# Patient Record
Sex: MALE | Race: WHITE | NOT HISPANIC OR LATINO
[De-identification: names, ages, dates, MRNs, and addresses within clinical notes are randomized per-mention and may not be internally consistent; named-entity substitution may affect disease eponyms.]

---

## 2024-07-09 ENCOUNTER — NON-APPOINTMENT (OUTPATIENT)
Age: 64
End: 2024-07-09

## 2024-07-24 ENCOUNTER — APPOINTMENT (OUTPATIENT)
Dept: OTOLARYNGOLOGY | Facility: CLINIC | Age: 64
End: 2024-07-24
Payer: COMMERCIAL

## 2024-07-24 DIAGNOSIS — Z78.9 OTHER SPECIFIED HEALTH STATUS: ICD-10-CM

## 2024-07-24 DIAGNOSIS — H90.3 SENSORINEURAL HEARING LOSS, BILATERAL: ICD-10-CM

## 2024-07-24 DIAGNOSIS — Z80.9 FAMILY HISTORY OF MALIGNANT NEOPLASM, UNSPECIFIED: ICD-10-CM

## 2024-07-24 DIAGNOSIS — Z82.3 FAMILY HISTORY OF STROKE: ICD-10-CM

## 2024-07-24 PROBLEM — Z00.00 ENCOUNTER FOR PREVENTIVE HEALTH EXAMINATION: Status: ACTIVE | Noted: 2024-07-24

## 2024-07-24 PROCEDURE — 92550 TYMPANOMETRY & REFLEX THRESH: CPT | Mod: 52

## 2024-07-24 PROCEDURE — 99203 OFFICE O/P NEW LOW 30 MIN: CPT

## 2024-07-24 PROCEDURE — 92557 COMPREHENSIVE HEARING TEST: CPT

## 2024-07-24 NOTE — DATA REVIEWED
[de-identified] : In light of the patients current symptoms, Complete audiometry was ordered and completed today. I have interpreted these results and reviewed them in detail with the patient.  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

## 2024-07-24 NOTE — ASSESSMENT
[FreeTextEntry1] : The etiologies of asymmetric high-frequency hearing loss were discussed.  Cannot rule out acoustic neuroma.  I have reviewed this and have reviewed management strategies.  We discussed the option of MRI imaging.  We discussed amplification.  I have recommended follow-up with repeat audiometry annually and as needed.

## 2024-07-24 NOTE — HISTORY OF PRESENT ILLNESS
Hearing aids in place. Glasses at bedside. Family with pt at bedside. Pt reports ambulating without assistance.    Admit info / head to toe assessment done.    [de-identified] : TAQUERIA MADRID has a history of hearing loss noted in the right ear greater than the left ear; noted approximately 1 year ago.  Tinnitus is noted bilaterally.  Some difficulty understanding words in a noisy environment.  No prior ear disease. No prior noise exposure reported.

## 2024-07-25 ENCOUNTER — TRANSCRIPTION ENCOUNTER (OUTPATIENT)
Age: 64
End: 2024-07-25